# Patient Record
Sex: MALE | Race: WHITE | Employment: FULL TIME | URBAN - NONMETROPOLITAN AREA
[De-identification: names, ages, dates, MRNs, and addresses within clinical notes are randomized per-mention and may not be internally consistent; named-entity substitution may affect disease eponyms.]

---

## 2018-07-26 ENCOUNTER — NURSE ONLY (OUTPATIENT)
Dept: LAB | Age: 28
End: 2018-07-26
Payer: COMMERCIAL

## 2018-07-26 DIAGNOSIS — Z23 NEED FOR VACCINATION: Primary | ICD-10-CM

## 2018-07-26 PROCEDURE — 90715 TDAP VACCINE 7 YRS/> IM: CPT | Performed by: FAMILY MEDICINE

## 2018-07-26 PROCEDURE — 90471 IMMUNIZATION ADMIN: CPT | Performed by: FAMILY MEDICINE

## 2019-11-08 ENCOUNTER — OFFICE VISIT (OUTPATIENT)
Dept: PRIMARY CARE CLINIC | Age: 29
End: 2019-11-08
Payer: COMMERCIAL

## 2019-11-08 VITALS
BODY MASS INDEX: 37.14 KG/M2 | WEIGHT: 289.4 LBS | OXYGEN SATURATION: 98 % | SYSTOLIC BLOOD PRESSURE: 138 MMHG | DIASTOLIC BLOOD PRESSURE: 80 MMHG | TEMPERATURE: 98.5 F | RESPIRATION RATE: 18 BRPM | HEIGHT: 74 IN | HEART RATE: 123 BPM

## 2019-11-08 DIAGNOSIS — K13.79 UVULAR SWELLING: ICD-10-CM

## 2019-11-08 DIAGNOSIS — J06.9 VIRAL UPPER RESPIRATORY TRACT INFECTION: Primary | ICD-10-CM

## 2019-11-08 PROCEDURE — 99213 OFFICE O/P EST LOW 20 MIN: CPT | Performed by: NURSE PRACTITIONER

## 2019-11-08 RX ORDER — METHYLPREDNISOLONE 4 MG/1
TABLET ORAL
Qty: 1 KIT | Refills: 0 | Status: SHIPPED | OUTPATIENT
Start: 2019-11-08 | End: 2019-11-14

## 2019-11-08 ASSESSMENT — ENCOUNTER SYMPTOMS
GASTROINTESTINAL NEGATIVE: 1
SORE THROAT: 0
COUGH: 1
WHEEZING: 0
RHINORRHEA: 0
SINUS PRESSURE: 0

## 2019-11-08 ASSESSMENT — PATIENT HEALTH QUESTIONNAIRE - PHQ9
2. FEELING DOWN, DEPRESSED OR HOPELESS: 0
SUM OF ALL RESPONSES TO PHQ QUESTIONS 1-9: 0
1. LITTLE INTEREST OR PLEASURE IN DOING THINGS: 0
SUM OF ALL RESPONSES TO PHQ QUESTIONS 1-9: 0
SUM OF ALL RESPONSES TO PHQ9 QUESTIONS 1 & 2: 0

## 2020-05-08 ENCOUNTER — OFFICE VISIT (OUTPATIENT)
Dept: PRIMARY CARE CLINIC | Age: 30
End: 2020-05-08
Payer: COMMERCIAL

## 2020-05-08 VITALS
SYSTOLIC BLOOD PRESSURE: 134 MMHG | WEIGHT: 267 LBS | BODY MASS INDEX: 33.2 KG/M2 | HEIGHT: 75 IN | TEMPERATURE: 99.1 F | DIASTOLIC BLOOD PRESSURE: 82 MMHG | HEART RATE: 112 BPM | RESPIRATION RATE: 16 BRPM | OXYGEN SATURATION: 98 %

## 2020-05-08 PROCEDURE — 98925 OSTEOPATH MANJ 1-2 REGIONS: CPT | Performed by: FAMILY MEDICINE

## 2020-05-08 PROCEDURE — 99214 OFFICE O/P EST MOD 30 MIN: CPT | Performed by: FAMILY MEDICINE

## 2020-05-08 RX ORDER — PREDNISONE 20 MG/1
TABLET ORAL
Qty: 15 TABLET | Refills: 0 | Status: SHIPPED | OUTPATIENT
Start: 2020-05-08 | End: 2020-11-05 | Stop reason: ALTCHOICE

## 2020-05-08 RX ORDER — TIZANIDINE 4 MG/1
4 TABLET ORAL EVERY 8 HOURS PRN
Qty: 30 TABLET | Refills: 1 | Status: SHIPPED | OUTPATIENT
Start: 2020-05-08

## 2020-05-08 ASSESSMENT — ENCOUNTER SYMPTOMS
ABDOMINAL PAIN: 0
NAUSEA: 0
CHEST TIGHTNESS: 1
BACK PAIN: 1
COUGH: 0
SHORTNESS OF BREATH: 0
ABDOMINAL DISTENTION: 0
SPUTUM PRODUCTION: 0
CONSTIPATION: 0
DIARRHEA: 0
VOMITING: 0
EYES NEGATIVE: 1

## 2020-05-08 NOTE — PROGRESS NOTES
joint swelling, neck pain and neck stiffness. Skin: Negative. Psychiatric/Behavioral: Negative. Prior to Visit Medications    Medication Sig Taking? Authorizing Provider   predniSONE (DELTASONE) 20 MG tablet 1 bid for 5 days, 1 qd for 5 days Yes Mitul Nino DO   tiZANidine (ZANAFLEX) 4 MG tablet Take 1 tablet by mouth every 8 hours as needed (muscle spasms) Yes Mitul Nino DO        Social History     Tobacco Use    Smoking status: Former Smoker     Last attempt to quit: 2017     Years since quittin.4    Smokeless tobacco: Never Used   Substance Use Topics    Alcohol use: Yes     Comment: socially        Vitals:    20 1227   BP: 134/82   Pulse: 112   Resp: 16   Temp: 99.1 °F (37.3 °C)   SpO2: 98%   Weight: 267 lb (121.1 kg)   Height: 6' 3\" (1.905 m)     Estimated body mass index is 33.37 kg/m² as calculated from the following:    Height as of this encounter: 6' 3\" (1.905 m). Weight as of this encounter: 267 lb (121.1 kg). Physical Exam  Vitals signs and nursing note reviewed. Constitutional:       General: He is not in acute distress. Appearance: He is well-developed. He is not diaphoretic. HENT:      Head: Normocephalic and atraumatic. Eyes:      Conjunctiva/sclera: Conjunctivae normal.   Neck:      Musculoskeletal: Normal range of motion. Cardiovascular:      Rate and Rhythm: Normal rate and regular rhythm. Pulmonary:      Effort: Pulmonary effort is normal. No respiratory distress. Breath sounds: No wheezing, rhonchi or rales. Chest:      Chest wall: Tenderness present. Abdominal:      General: Abdomen is flat. Bowel sounds are normal. There is no distension. Palpations: Abdomen is soft. Tenderness: There is no abdominal tenderness. Musculoskeletal:         General: Tenderness present. Comments: Palpable anterior inferior positioning of the right rib 8 with pain with palpation to the area.   Increased paravertebral muscular

## 2020-11-05 ENCOUNTER — HOSPITAL ENCOUNTER (OUTPATIENT)
Age: 30
Setting detail: SPECIMEN
Discharge: HOME | End: 2020-11-05
Payer: COMMERCIAL

## 2020-11-05 ENCOUNTER — OFFICE VISIT (OUTPATIENT)
Dept: PRIMARY CARE CLINIC | Age: 30
End: 2020-11-05
Payer: COMMERCIAL

## 2020-11-05 VITALS
SYSTOLIC BLOOD PRESSURE: 130 MMHG | OXYGEN SATURATION: 97 % | WEIGHT: 279 LBS | TEMPERATURE: 100.2 F | HEART RATE: 120 BPM | BODY MASS INDEX: 34.87 KG/M2 | DIASTOLIC BLOOD PRESSURE: 80 MMHG

## 2020-11-05 PROCEDURE — 99213 OFFICE O/P EST LOW 20 MIN: CPT | Performed by: NURSE PRACTITIONER

## 2020-11-05 PROCEDURE — U0003 INFECTIOUS AGENT DETECTION BY NUCLEIC ACID (DNA OR RNA); SEVERE ACUTE RESPIRATORY SYNDROME CORONAVIRUS 2 (SARS-COV-2) (CORONAVIRUS DISEASE [COVID-19]), AMPLIFIED PROBE TECHNIQUE, MAKING USE OF HIGH THROUGHPUT TECHNOLOGIES AS DESCRIBED BY CMS-2020-01-R: HCPCS

## 2020-11-05 ASSESSMENT — ENCOUNTER SYMPTOMS
CHEST TIGHTNESS: 0
COUGH: 1
WHEEZING: 0
GASTROINTESTINAL NEGATIVE: 1
RHINORRHEA: 0
SINUS PRESSURE: 0
SORE THROAT: 0
SHORTNESS OF BREATH: 0

## 2020-11-05 NOTE — PROGRESS NOTES
[facility]             [address], [address]                        Telephone [phone]             Fax [phone]     Juan Carlos Wang  1990  KQZ:[id number]   Date of visit:  11/5/2020    Subjective:    Juan Carlos Wang is a 31 y.o.  male who presents to [facility] today (11/5/2020) for evaluation of:    Chief Complaint   Patient presents with    Cough     started last night. body aches. fever high of 101. slight HA. Cough   This is a new problem. The current episode started yesterday. The problem has been gradually worsening. The problem occurs every few minutes. The cough is non-productive. Associated symptoms include a fever (101.8 today), headaches (mild) and myalgias. Pertinent negatives include no chest pain, chills, ear pain, nasal congestion, postnasal drip, rash, rhinorrhea, sore throat, shortness of breath or wheezing. Nothing aggravates the symptoms. He has tried nothing for the symptoms. The treatment provided no relief. He has the following problem list:  There is no problem list on file for this patient. Current medications are:  Current Outpatient Medications   Medication Sig Dispense Refill    tiZANidine (ZANAFLEX) 4 MG tablet Take 1 tablet by mouth every 8 hours as needed (muscle spasms) (Patient not taking: Reported on 11/5/2020) 30 tablet 1     No current facility-administered medications for this visit. He has No Known Allergies. [place] He  reports that he quit smoking about 2 years ago. He has never used smokeless tobacco.      Objective:    Vitals:    11/05/20 0847   BP: 130/80   Site: Right Upper Arm   Position: Sitting   Cuff Size: Large Adult   Pulse: 120   Temp: 100.2 °F (37.9 °C)   TempSrc: Temporal   SpO2: 97%   Weight: 279 lb (126.6 kg)     Body mass index is 34.87 kg/m².     Review of Systems   Constitutional: Positive for fatigue and fever (101.8 today). Negative for appetite change and chills. HENT: Negative for congestion, ear pain, postnasal drip, rhinorrhea, sinus pressure and sore throat. Respiratory: Positive for cough. Negative for chest tightness, shortness of breath and wheezing. Cardiovascular: Negative. Negative for chest pain. Gastrointestinal: Negative. Musculoskeletal: Positive for myalgias. Skin: Negative for rash. Neurological: Positive for headaches (mild). Physical Exam  Vitals signs and nursing note reviewed. Constitutional:       Appearance: He is well-developed. HENT:      Head: Normocephalic. Jaw: There is normal jaw occlusion. Right Ear: Tympanic membrane, ear canal and external ear normal.      Left Ear: Tympanic membrane, ear canal and external ear normal.      Nose:      Right Turbinates: Swollen (erythema). Left Turbinates: Swollen (erythema). Right Sinus: No maxillary sinus tenderness or frontal sinus tenderness. Left Sinus: No maxillary sinus tenderness or frontal sinus tenderness. Mouth/Throat:      Lips: Pink. Mouth: Mucous membranes are moist.      Pharynx: Oropharynx is clear. Uvula midline. Posterior oropharyngeal erythema present. Tonsils: 1+ on the right. 1+ on the left. Eyes:      Pupils: Pupils are equal, round, and reactive to light. Neck:      Musculoskeletal: Normal range of motion and neck supple. Cardiovascular:      Rate and Rhythm: Normal rate and regular rhythm. Heart sounds: Normal heart sounds. Pulmonary:      Effort: Pulmonary effort is normal.      Breath sounds: Normal breath sounds and air entry. Comments: Dry cough noted. Lymphadenopathy:      Cervical: No cervical adenopathy. Skin:     General: Skin is warm and dry. Neurological:      Mental Status: He is alert and oriented to person, place, and time. Psychiatric:         Behavior: Behavior normal.         Thought Content:  Thought content normal. Assessment and Plan:    No results found for this visit on 11/05/20. Diagnosis Orders   1. Cough  Covid-19 Ambulatory   2. Fever, unspecified fever cause  Covid-19 Ambulatory   3. Person under investigation for COVID-19  Covid-19 Ambulatory     Self quarantine until negative Covid-19 test result received and symptoms improving. We will call with Covid-19 test results. Take Robitussin or Delsym as needed for cough. he was also encouraged to use tylenol or ibuprofen for pain/fever. Increase water intake. Use cool mist humidifier at bedtime. Use nasal saline flush as needed. Good hand hygiene. he was instructed to return if there is no improvement or symptoms worsen. The use, risks, benefits, and side effects of prescribed or recommended medications were discussed. All questions were answered and the patient/caregiver voiced understanding. No orders of the defined types were placed in this encounter.         Electronically signed by MARISOL Guido CNP on 11/5/20 at 9:04 AM EST

## 2020-11-05 NOTE — LETTER
[address] [facility]  [address] [facility] [address]  Phone: [phone]  Fax: Sandro Levine 71., APRN - [place]        November 5, 2020     Patient: Monica Bazan   YOB: 1990   Date of Visit: 11/5/2020       To Whom it May Concern:    Monica Bazan was seen in my clinic on 11/5/2020. May return to work with negative Covid-19 test result and improved symptoms. Test result in 3-7 days. If you have any questions or concerns, please don't hesitate to call.     Sincerely,         MARISOL Fleming CNP

## 2020-11-05 NOTE — PATIENT INSTRUCTIONS
Patient Education        Learning About Coronavirus [phone])  Coronavirus [phone]): Overview  What is coronavirus ([id number]-[id number])? The coronavirus disease (COVID-19) is caused by a virus. It is an illness that was first found in December 2019. It has since spread worldwide. The virus can cause fever, cough, and trouble breathing. In severe cases, it can cause pneumonia and make it hard to breathe without help. It can cause death. This virus spreads person-to-person through droplets from coughing and sneezing. It can also spread when you are close to someone who is infected. And it can spread when you touch something that has the virus on it, such as a doorknob or a tabletop. Coronaviruses are a large group of viruses. They cause the common cold. They also cause more serious illnesses like Middle East respiratory syndrome (MERS) and severe acute respiratory syndrome (SARS). COVID-19 is caused by a novel coronavirus. That means it's a new type that has not been seen in people before. How is COVID-19 treated? Mild illness can be treated at home, but more serious illness needs to be treated in the hospital. Treatment may include medicines to reduce symptoms, plus breathing support such as oxygen therapy or a ventilator. Other treatments, such as antiviral medicines, may help people who have COVID-19. What can you do to protect yourself from COVID-19? The best way to protect yourself from getting sick is to:  · Avoid areas where there is an outbreak. · Avoid contact with people who may be infected. · Avoid crowds and try to stay at least 6 feet away from other people. · Wash your hands often, especially after you cough or sneeze. Use soap and water, and scrub for at least 20 seconds. If soap and water aren't available, use an alcohol-based hand . · Avoid touching your mouth, nose, and eyes. What can you do to avoid spreading the virus to others?   To help avoid spreading the virus to others:  · [employer] your hands often with soap or alcohol-based hand sanitizers. · Cover your mouth with a tissue when you cough or sneeze. Then throw the tissue in the trash. · Use a disinfectant to clean things that you touch often. These include doorknobs, remote controls, phones, and handles on your refrigerator and microwave. And don't forget countertops, tabletops, bathrooms, and computer keyboards. · Wear a cloth face cover if you have to go to public areas. If you know or suspect that you have COVID-19:  · Stay home. Don't go to school, work, or public areas. And don't use public transportation, ride-shares, or taxis unless you have no choice. · Leave your home only if you need to get medical care or testing. But call the doctor's office first so they know you're coming. And wear a face cover. · Limit contact with people in your home. If possible, stay in a separate bedroom and use a separate bathroom. · Wear a face cover whenever you're around other people. It can help stop the spread of the virus when you cough or sneeze. · Clean and disinfect your home every day. Use household  and disinfectant wipes or sprays. Take special care to clean things that you grab with your hands. · Self-isolate until it's safe to be around others again. ? If you have symptoms, it's safe when you haven't had a fever for 3 days and your symptoms have improved and it's been at least 10 days since your symptoms started. ? If you were exposed to the virus but don't have symptoms, it's safe to be around others 14 days after exposure. ? Talk to your doctor about whether you also need testing, especially if you have a weakened immune system. When to call for help  Call 911 anytime you think you may need emergency care. For example, call if:  · You have severe trouble breathing. (You can't talk at all.)  · You have constant chest pain or pressure. · You are severely dizzy or lightheaded.   · You are confused or can't think clearly. · Your face and lips have a blue color. · You passed out (lost consciousness) or are very hard to wake up. Call your doctor now if you develop symptoms such as:  · Shortness of breath. · Fever. · Cough. If you need to get care, call ahead to the doctor's office for instructions before you go. Make sure you wear a face cover to prevent exposing other people to the virus. Where can you get the latest information? The following health organizations are tracking and studying this virus. Their websites contain the most up-to-date information. Kerry Vicky also learn what to do if you think you may have been exposed to the virus. · U.S. Centers for Disease Control and Prevention (CDC): The CDC provides updated news about the disease and travel advice. The website also tells you how to prevent the spread of infection. www.cdc.gov  · World Health Organization [facility]): WHO offers information about the virus outbreaks. WHO also has travel advice. www.who.int  Current as of: July 10, 2020               Content Version: 12.6  © 2006-2020 [employer]. Care instructions adapted under license by [facility] ([facility]). If you have questions about a medical condition or this instruction, always ask your healthcare professional. [place] [address] any warranty or liability for your use of this information. Patient Education        Coronavirus ([id number]-66): Care Instructions  Overview  The coronavirus disease (COVID-19) is caused by a virus. Symptoms may include a fever, a cough, and shortness of breath. It mainly spreads person-to-person through droplets from coughing and sneezing. The virus also can spread when people are in close contact with someone who is infected. Most people have mild symptoms and can take care of themselves at home.  If their symptoms get worse, they may need care in a hospital. Treatment may include medicines to reduce symptoms, plus breathing support such as oxygen therapy or a ventilator. It's important to not spread the virus to others. If you have COVID-19, wear a face cover anytime you are around other people. You need to isolate yourself while you are sick. Leave your home only if you need to get medical care or testing. Follow-up care is a key part of your treatment and safety. Be sure to make and go to all appointments, and call your doctor if you are having problems. It's also a good idea to know your test results and keep a list of the medicines you take. How can you care for yourself at home? · Get extra rest. It can help you feel better. · Drink plenty of fluids. This helps replace fluids lost from fever. Fluids also help ease a scratchy throat. Water, soup, fruit juice, and hot tea with lemon are good choices. · Take acetaminophen (such as Tylenol) to reduce a fever. It may also help with muscle aches. Read and follow all instructions on the label. · Use petroleum jelly on sore skin. This can help if the skin around your nose and lips becomes sore from rubbing a lot with tissues. Tips for self-isolation  · Limit contact with people in your home. If possible, stay in a separate bedroom and use a separate bathroom. · Wear a cloth face cover when you are around other people. It can help stop the spread of the virus when you cough or sneeze. · If you have to leave home, avoid crowds and try to stay at least 6 feet away from other people. · Avoid contact with pets and other animals. · Cover your mouth and nose with a tissue when you cough or sneeze. Then throw it in the trash right away. · Wash your hands often, especially after you cough or sneeze. Use soap and water, and scrub for at least 20 seconds. If soap and water aren't available, use an alcohol-based hand . · Don't share personal household items. These include bedding, towels, cups and glasses, and eating utensils.   · [employer] laundry in the warmest water allowed for the fabric type, and dry it completely. It's okay to wash other people's laundry with yours. · Clean and disinfect your home every day. Use household  and disinfectant wipes or sprays. Take special care to clean things that you grab with your hands. These include doorknobs, remote controls, phones, and handles on your refrigerator and microwave. And don't forget countertops, tabletops, bathrooms, and computer keyboards. When you can end self-isolation  · If you know or suspect that you have COVID-19, stay in self-isolation until:  ? You haven't had a fever for 3 days, and  ? Your symptoms have improved, and  ? It's been at least 10 days since your symptoms started. · Talk to your doctor about whether you also need testing, especially if you have a weakened immune system. When should you call for help? Call 911 anytime you think you may need emergency care. For example, call if you have life-threatening symptoms, such as:    · You have severe trouble breathing. (You can't talk at all.)     · You have constant chest pain or pressure.     · You are severely dizzy or lightheaded.     · You are confused or can't think clearly.     · Your face and lips have a blue color.     · You pass out (lose consciousness) or are very hard to wake up. Call your doctor now or seek immediate medical care if:    · You have moderate trouble breathing. (You can't speak a full sentence.)     · You are coughing up blood (more than about 1 teaspoon).     · You have signs of low blood pressure. These include feeling lightheaded; being too weak to stand; and having cold, pale, clammy skin. Watch closely for changes in your health, and be sure to contact your doctor if:    · Your symptoms get worse.     · You are not getting better as expected. Call before you go to the doctor's office. Follow their instructions. And wear a cloth face cover. Current as of: July 10, 2020               Content Version: 12.6  © 4230-8622 [employer]. Care instructions adapted under license by [facility] ([facility]). If you have questions about a medical condition or this instruction, always ask your healthcare professional. [place] [address] any warranty or liability for your use of this information. Patient Education        Cough: Care Instructions  Your Care Instructions     A cough is your body's response to something that bothers your throat or airways. Many things can cause a cough. You might cough because of a cold or the flu, bronchitis, or asthma. Smoking, postnasal drip, allergies, and stomach acid that backs up into your throat also can cause coughs. A cough is a symptom, not a disease. Most coughs stop when the cause, such as a cold, goes away. You can take a few steps at home to cough less and feel better. Follow-up care is a key part of your treatment and safety. Be sure to make and go to all appointments, and call your doctor if you are having problems. It's also a good idea to know your test results and keep a list of the medicines you take. How can you care for yourself at home? · Drink lots of water and other fluids. This helps thin the mucus and soothes a dry or sore throat. Honey or lemon juice in hot water or tea may ease a dry cough. · Take cough medicine as directed by your doctor. · Prop up your head on pillows to help you breathe and ease a dry cough. · Try cough drops to soothe a dry or sore throat. Cough drops don't stop a cough. Medicine-flavored cough drops are no better than candy-flavored drops or hard candy. · Do not smoke. Avoid secondhand smoke. If you need help quitting, talk to your doctor about stop-smoking programs and medicines. These can increase your chances of quitting for good. When should you call for help? Call 911 anytime you think you may need emergency care. For example, call if:    · You have severe trouble breathing.    Call your doctor now or seek immediate medical care if:    · You cough up blood.     · You have new or worse trouble breathing.     · You have a new or higher fever.     · You have a new rash. Watch closely for changes in your health, and be sure to contact your doctor if:    · You cough more deeply or more often, especially if you notice more mucus or a change in the color of your mucus.     · You have new symptoms, such as a sore throat, an earache, or sinus pain.     · You do not get better as expected. Where can you learn more? Go to https://[employer]. org and sign in to your "[employer] account. Enter D279 in the [employer] box to learn more about \"Cough: Care Instructions. \"     If you do not have an account, please click on the \"Sign Up Now\" link. Current as of: February 24, 2020               Content Version: 12.6  © 2994-5225 [employer]. Care instructions adapted under license by [facility] ([facility]). If you have questions about a medical condition or this instruction, always ask your healthcare professional. [address] any warranty or liability for your use of this information.

## 2020-11-07 LAB — SARS-COV-2, NAA: DETECTED

## 2020-11-08 ENCOUNTER — TELEPHONE (OUTPATIENT)
Dept: PRIMARY CARE CLINIC | Age: 30
End: 2020-11-08

## 2021-06-15 ENCOUNTER — HOSPITAL ENCOUNTER (OUTPATIENT)
Age: 31
Setting detail: SPECIMEN
Discharge: HOME | End: 2021-06-15
Payer: COMMERCIAL

## 2021-06-15 ENCOUNTER — OFFICE VISIT (OUTPATIENT)
Dept: PRIMARY CARE CLINIC | Age: 31
End: 2021-06-15
Payer: COMMERCIAL

## 2021-06-15 VITALS
HEIGHT: 75 IN | OXYGEN SATURATION: 98 % | HEART RATE: 113 BPM | RESPIRATION RATE: 18 BRPM | WEIGHT: 286.4 LBS | TEMPERATURE: 97.8 F | DIASTOLIC BLOOD PRESSURE: 84 MMHG | BODY MASS INDEX: 35.61 KG/M2 | SYSTOLIC BLOOD PRESSURE: 132 MMHG

## 2021-06-15 DIAGNOSIS — R09.81 CONGESTION OF NASAL SINUS: ICD-10-CM

## 2021-06-15 DIAGNOSIS — R05.9 COUGH: ICD-10-CM

## 2021-06-15 DIAGNOSIS — B97.89 ACUTE VIRAL SINUSITIS: Primary | ICD-10-CM

## 2021-06-15 DIAGNOSIS — Z20.822 PERSON UNDER INVESTIGATION FOR COVID-19: ICD-10-CM

## 2021-06-15 DIAGNOSIS — J01.90 ACUTE VIRAL SINUSITIS: Primary | ICD-10-CM

## 2021-06-15 PROCEDURE — U0005 INFEC AGEN DETEC AMPLI PROBE: HCPCS

## 2021-06-15 PROCEDURE — 99213 OFFICE O/P EST LOW 20 MIN: CPT | Performed by: NURSE PRACTITIONER

## 2021-06-15 PROCEDURE — U0003 INFECTIOUS AGENT DETECTION BY NUCLEIC ACID (DNA OR RNA); SEVERE ACUTE RESPIRATORY SYNDROME CORONAVIRUS 2 (SARS-COV-2) (CORONAVIRUS DISEASE [COVID-19]), AMPLIFIED PROBE TECHNIQUE, MAKING USE OF HIGH THROUGHPUT TECHNOLOGIES AS DESCRIBED BY CMS-2020-01-R: HCPCS

## 2021-06-15 ASSESSMENT — ENCOUNTER SYMPTOMS
WHEEZING: 0
DIARRHEA: 0
NAUSEA: 0
VOMITING: 0
SORE THROAT: 0
SHORTNESS OF BREATH: 0
RHINORRHEA: 1
COUGH: 1

## 2021-06-15 ASSESSMENT — PATIENT HEALTH QUESTIONNAIRE - PHQ9
SUM OF ALL RESPONSES TO PHQ QUESTIONS 1-9: 0
SUM OF ALL RESPONSES TO PHQ QUESTIONS 1-9: 0
2. FEELING DOWN, DEPRESSED OR HOPELESS: 0
1. LITTLE INTEREST OR PLEASURE IN DOING THINGS: 0
SUM OF ALL RESPONSES TO PHQ9 QUESTIONS 1 & 2: 0
SUM OF ALL RESPONSES TO PHQ QUESTIONS 1-9: 0

## 2021-06-15 NOTE — PATIENT INSTRUCTIONS
Will notify you of covid test result as soon as available. You should isolate at home in an area away from family. If you must be around family members, please wear a mask. Quarantine at home until result is available. This means do not go to work/school, attend family gatherings, or invite others to your home until you know your test results. A work/school note will be provided for you. Symptoms appear viral; no antibiotic warranted at this time. Increase your water intake to help thin secretions; 2-3 liters of water/day is recommended. Recommend trying sinus rinses to help thin secretions also; you may do this 3-4 times daily. May want to try daily antihistamine (zyrtec, claritin, allegra, xyzal, etc) and daily flonase nasal spray to help with chronic sinus symptoms. Tylenol and/or ibuprofen for headaches and/or fever. Warm compresses to face can help with sinus pain/pressure. Use cool mist humidifier at bedtime to help combat dry air. Practice good hand hygiene and cover your cough and sneeze to prevent passing virus on. If symptoms worsen or fail to improve, please return to clinic. If you develop severe worsening of symptoms such as chest pain or difficulty breathing, please go to ER. Patient Education        Sinusitis: Care Instructions  Your Care Instructions     Sinusitis is an infection of the lining of the sinus cavities in your head. Sinusitis often follows a cold. It causes pain and pressure in your head and face. In most cases, sinusitis gets better on its own in 1 to 2 weeks. But some mild symptoms may last for several weeks. Sometimes antibiotics are needed. Follow-up care is a key part of your treatment and safety. Be sure to make and go to all appointments, and call your doctor if you are having problems. It's also a good idea to know your test results and keep a list of the medicines you take. How can you care for yourself at home?   · Take an over-the-counter pain medicine, such as acetaminophen (Tylenol), ibuprofen (Advil, Motrin), or naproxen (Aleve). Read and follow all instructions on the label. · If the doctor prescribed antibiotics, take them as directed. Do not stop taking them just because you feel better. You need to take the full course of antibiotics. · Be careful when taking over-the-counter cold or flu medicines and Tylenol at the same time. Many of these medicines have acetaminophen, which is Tylenol. Read the labels to make sure that you are not taking more than the recommended dose. Too much acetaminophen (Tylenol) can be harmful. · Breathe warm, moist air from a steamy shower, a hot bath, or a sink filled with hot water. Avoid cold, dry air. Using a humidifier in your home may help. Follow the directions for cleaning the machine. · Use saline (saltwater) nasal washes. This can help keep your nasal passages open and wash out mucus and bacteria. You can buy saline nose drops at a grocery store or drugstore. Or you can make your own at home by adding 1 teaspoon of salt and 1 teaspoon of baking soda to 2 cups of distilled water. If you make your own, fill a bulb syringe with the solution, insert the tip into your nostril, and squeeze gently. Ericka Godwin your nose. · Put a hot, wet towel or a warm gel pack on your face 3 or 4 times a day for 5 to 10 minutes each time. · Try a decongestant nasal spray like oxymetazoline (Afrin). Do not use it for more than 3 days in a row. Using it for more than 3 days can make your congestion worse. When should you call for help? Call your doctor now or seek immediate medical care if:    · You have new or worse swelling or redness in your face or around your eyes.     · You have a new or higher fever.    Watch closely for changes in your health, and be sure to contact your doctor if:    · You have new or worse facial pain.     · The mucus from your nose becomes thicker (like pus) or has new blood in it.     · You are not getting better as syringe by relaxing your hand. · Sit down with your head tilted slightly back. Do not lie down. Put the tip of the bulb syringe or the squeeze bottle a little way into one of your nostrils. Gently drip or squirt a few drops into the nostril. Repeat with the other nostril. Some sneezing and gagging are normal at first.  · Gently blow your nose. · Wipe the syringe or bottle tip clean after each use. · Repeat this 2 or 3 times a day. · Use nasal washes gently if you have nosebleeds often. When should you call for help? Watch closely for changes in your health, and be sure to contact your doctor if:    · You often get nosebleeds.     · You have problems doing the nasal washes. Where can you learn more? Go to https://Jibe Mobilepepiceweb.R[employer] Concursos. org and sign in to your ShareMagnet account. Enter [id number] [id number] [phone] in the ThinkLink box to learn more about \"Saline Nasal Washes: Care Instructions. \"     If you do not have an account, please click on the \"Sign Up Now\" link. Current as of: December 2, 2020               Content Version: 12.8  © 1313-0185 Healthwise, Incorporated. Care instructions adapted under license by [facility] ([facility]). If you have questions about a medical condition or this instruction, always ask your healthcare professional. [address] any warranty or liability for your use of this information.

## 2021-06-15 NOTE — PROGRESS NOTES
tablet 1     No facility-administered medications prior to visit. ALLERGIES     Patient is has No Known Allergies. FAMILY HISTORY     Patient's family history is not on file. SOCIAL HISTORY     Patient  reports that he quit smoking about 3 years ago. He has never used smokeless tobacco. He reports current alcohol use. He reports that he does not use drugs. PHYSICAL EXAM     VITALS  BP: 132/84, Temp: 97.8 °F (36.6 °C), Pulse: 113, Resp: 18, SpO2: 98 %  Physical Exam  Vitals reviewed. Constitutional:       General: He is not in acute distress. Appearance: He is not ill-appearing. HENT:      Right Ear: Tympanic membrane and ear canal normal.      Left Ear: Tympanic membrane and ear canal normal.      Nose: Nose normal. No congestion or rhinorrhea. Mouth/Throat:      Mouth: Mucous membranes are moist.      Pharynx: Oropharynx is clear. No oropharyngeal exudate or posterior oropharyngeal erythema. Eyes:      Pupils: Pupils are equal, round, and reactive to light. Cardiovascular:      Rate and Rhythm: Normal rate and regular rhythm. Heart sounds: Normal heart sounds, S1 normal and S2 normal. No murmur heard. Pulmonary:      Effort: Pulmonary effort is normal. No accessory muscle usage or respiratory distress. Breath sounds: Normal breath sounds. No wheezing or rhonchi. Musculoskeletal:         General: Normal range of motion. Cervical back: Normal range of motion and neck supple. No rigidity. Lymphadenopathy:      Cervical: No cervical adenopathy. Skin:     General: Skin is warm and dry. Capillary Refill: Capillary refill takes less than 2 seconds. Neurological:      General: No focal deficit present. Mental Status: He is alert. DIAGNOSTIC RESULTS   Labs:No results found for this visit on 06/15/21.     IMAGING:        CLINICAL COURSE:     Vitals:    06/15/21 1609   BP: 132/84   Pulse: 113   Resp: 18   Temp: 97.8 °F (36.6 °C)   TempSrc: Temporal SpO2: 98%   Weight: 286 lb 6.4 oz (129.9 kg)   Height: 6' 3\" (1.905 m)           PROCEDURES:  None  FINAL IMPRESSION      1. Acute viral sinusitis    2. Cough    3. Congestion of nasal sinus    4. Person under investigation for COVID-19         DISPOSITION/PLAN   Symptoms appear viral at this time. Covid testing collected and quarantine guidelines reviewed; will notify as soon as results are available. Discussed supportive measures for symptom relief and educated pt on s/s that warrant return to clinic. Work note provided. Patient Instructions     Will notify you of covid test result as soon as available. You should isolate at home in an area away from family. If you must be around family members, please wear a mask. Quarantine at home until result is available. This means do not go to work/school, attend family gatherings, or invite others to your home until you know your test results. A work/school note will be provided for you. Symptoms appear viral; no antibiotic warranted at this time. Increase your water intake to help thin secretions; 2-3 liters of water/day is recommended. Recommend trying sinus rinses to help thin secretions also; you may do this 3-4 times daily. May want to try daily antihistamine (zyrtec, claritin, allegra, xyzal, etc) and daily flonase nasal spray to help with chronic sinus symptoms. Tylenol and/or ibuprofen for headaches and/or fever. Warm compresses to face can help with sinus pain/pressure. Use cool mist humidifier at bedtime to help combat dry air. Practice good hand hygiene and cover your cough and sneeze to prevent passing virus on. If symptoms worsen or fail to improve, please return to clinic. If you develop severe worsening of symptoms such as chest pain or difficulty breathing, please go to ER. Patient Education        Sinusitis: Care Instructions  Your Care Instructions     Sinusitis is an infection of the lining of the sinus cavities in your head. Sinusitis often follows a cold. It causes pain and pressure in your head and face. In most cases, sinusitis gets better on its own in 1 to 2 weeks. But some mild symptoms may last for several weeks. Sometimes antibiotics are needed. Follow-up care is a key part of your treatment and safety. Be sure to make and go to all appointments, and call your doctor if you are having problems. It's also a good idea to know your test results and keep a list of the medicines you take. How can you care for yourself at home? · Take an over-the-counter pain medicine, such as acetaminophen (Tylenol), ibuprofen (Advil, Motrin), or naproxen (Aleve). Read and follow all instructions on the label. · If the doctor prescribed antibiotics, take them as directed. Do not stop taking them just because you feel better. You need to take the full course of antibiotics. · Be careful when taking over-the-counter cold or flu medicines and Tylenol at the same time. Many of these medicines have acetaminophen, which is Tylenol. Read the labels to make sure that you are not taking more than the recommended dose. Too much acetaminophen (Tylenol) can be harmful. · Breathe warm, moist air from a steamy shower, a hot bath, or a sink filled with hot water. Avoid cold, dry air. Using a humidifier in your home may help. Follow the directions for cleaning the machine. · Use saline (saltwater) nasal washes. This can help keep your nasal passages open and wash out mucus and bacteria. You can buy saline nose drops at a grocery store or drugstore. Or you can make your own at home by adding 1 teaspoon of salt and 1 teaspoon of baking soda to 2 cups of distilled water. If you make your own, fill a bulb syringe with the solution, insert the tip into your nostril, and squeeze gently. Florie Pleva your nose. · Put a hot, wet towel or a warm gel pack on your face 3 or 4 times a day for 5 to 10 minutes each time.   · Try a decongestant nasal spray like oxymetazoline (Afrin). Do not use it for more than 3 days in a row. Using it for more than 3 days can make your congestion worse. When should you call for help? Call your doctor now or seek immediate medical care if:    · You have new or worse swelling or redness in your face or around your eyes.     · You have a new or higher fever. Watch closely for changes in your health, and be sure to contact your doctor if:    · You have new or worse facial pain.     · The mucus from your nose becomes thicker (like pus) or has new blood in it.     · You are not getting better as expected. Where can you learn more? Go to https://Genipepiceweb.Shippable. org and sign in to your Crocs account. Enter A804 in the [employer] box to learn more about \"Sinusitis: Care Instructions. \"     If you do not have an account, please click on the \"Sign Up Now\" link. Current as of: December 2, 2020               Content Version: 12.8  © 2006-2021 DivvyHQ. Care instructions adapted under license by [facility] ([facility]). If you have questions about a medical condition or this instruction, always ask your healthcare professional. [place] [address] any warranty or liability for your use of this information. Patient Education        Saline Nasal Washes: Care Instructions  Your Care Instructions     Saline nasal washes help keep the nasal passages open by washing out thick or dried mucus. This simple remedy can help relieve symptoms of allergies, sinusitis, and colds. It also can make the nose feel more comfortable by keeping the mucous membranes moist. You may notice a little burning sensation in your nose the first few times you use the solution, but this usually gets better in a few days. Follow-up care is a key part of your treatment and safety. Be sure to make and go to all appointments, and call your doctor if you are having problems.  It's also a good idea to know your test results and keep a list of the medicines you take. How can you care for yourself at home? · You can buy premixed saline solution in a squeeze bottle or other sinus rinse products at a drugstore. Read and follow the instructions on the label. · You also can make your own saline solution by adding 1 teaspoon of salt and 1 teaspoon of baking soda to 2 cups of distilled water. · If you use a homemade solution, pour a small amount into a clean bowl. Using a rubber bulb syringe, squeeze the syringe and place the tip in the salt water. Pull a small amount of the salt water into the syringe by relaxing your hand. · Sit down with your head tilted slightly back. Do not lie down. Put the tip of the bulb syringe or the squeeze bottle a little way into one of your nostrils. Gently drip or squirt a few drops into the nostril. Repeat with the other nostril. Some sneezing and gagging are normal at first.  · Gently blow your nose. · Wipe the syringe or bottle tip clean after each use. · Repeat this 2 or 3 times a day. · Use nasal washes gently if you have nosebleeds often. When should you call for help? Watch closely for changes in your health, and be sure to contact your doctor if:    · You often get nosebleeds.     · You have problems doing the nasal washes. Where can you learn more? Go to https://Blueshift International Materialspefelixeweb.Mobilligy. org and sign in to your QWiPS account. Enter [phone] in the [facility] box to learn more about \"Saline Nasal Washes: Care Instructions. \"     If you do not have an account, please click on the \"Sign Up Now\" link. Current as of: December 2, 2020               Content Version: 12.8  © 2006-2021 Healthwise, Cellcrypt. Care instructions adapted under license by [facility] ([facility]). If you have questions about a medical condition or this instruction, always ask your healthcare professional. Carlos Lockwood any warranty or liability for your use of this information.                The use, risks, benefits, and potential side effects of prescribed and/or recommended medications were discussed. All questions were answered and the patient/caregiver voiced understanding. Orders Placed This Encounter   Procedures    COVID-19     Standing Status:   Future     Standing Expiration Date:   7/15/2021     Scheduling Instructions:      1) Due to current limited availability of the COVID-19 test, tests will be prioritized based on responses to questions above. Testing may be delayed due to volume. 2) Print and instruct patient to adhere to CDC home isolation program. (Link Above)              3) Set up or refer patient for a monitoring program.              4) Have patient sign up for and leverage MyChart (if not previously done). Order Specific Question:   Is this test for diagnosis or screening? Answer:   Diagnosis of ill patient     Order Specific Question:   Symptomatic for COVID-19 as defined by CDC? Answer:   Yes     Order Specific Question:   Date of Symptom Onset     Answer:   6/12/2021     Order Specific Question:   Hospitalized for COVID-19? Answer:   No     Order Specific Question:   Admitted to ICU for COVID-19? Answer:   No     Order Specific Question:   Employed in healthcare setting? Answer:   No     Order Specific Question:   Resident in a congregate (group) care setting? Answer:   No     Order Specific Question:   Pregnant: Answer:   No     Order Specific Question:   Previously tested for COVID-19? Answer:   Yes     Outpatient Encounter Medications as of 6/15/2021   Medication Sig Dispense Refill    tiZANidine (ZANAFLEX) 4 MG tablet Take 1 tablet by mouth every 8 hours as needed (muscle spasms) (Patient not taking: Reported on 11/5/2020) 30 tablet 1     No facility-administered encounter medications on file as of 6/15/2021. Return if symptoms worsen or fail to improve.                 Electronically signed by Derenda Schwab, APRN - CNP on 6/15/2021 at 4:36 PM

## 2021-06-17 LAB
SARS-COV-2: ABNORMAL
SARS-COV-2: DETECTED
SOURCE: ABNORMAL

## 2021-10-15 ENCOUNTER — OFFICE VISIT (OUTPATIENT)
Dept: PRIMARY CARE CLINIC | Age: 31
End: 2021-10-15
Payer: COMMERCIAL

## 2021-10-15 VITALS
HEIGHT: 74 IN | OXYGEN SATURATION: 97 % | WEIGHT: 289.2 LBS | BODY MASS INDEX: 37.12 KG/M2 | DIASTOLIC BLOOD PRESSURE: 88 MMHG | TEMPERATURE: 97.2 F | SYSTOLIC BLOOD PRESSURE: 140 MMHG | HEART RATE: 100 BPM

## 2021-10-15 DIAGNOSIS — S39.012A STRAIN OF LUMBAR REGION, INITIAL ENCOUNTER: Primary | ICD-10-CM

## 2021-10-15 PROCEDURE — 99213 OFFICE O/P EST LOW 20 MIN: CPT | Performed by: FAMILY MEDICINE

## 2021-10-15 RX ORDER — PREDNISONE 20 MG/1
TABLET ORAL
Qty: 11 TABLET | Refills: 0 | Status: SHIPPED | OUTPATIENT
Start: 2021-10-15

## 2021-10-15 ASSESSMENT — ENCOUNTER SYMPTOMS: BACK PAIN: 1

## 2021-10-15 NOTE — PROGRESS NOTES
[address]  [address], [address] [place]  [phone]      Lynda Vaca is a 27 y.o. male who is c/o of Back Pain (lowers back, x 2 nights ago, NKI)      HPI:     Back Pain  This is a new problem. The current episode started in the past 7 days (2 nights ago, while at work - started to feel \"stiff\"). The pain is present in the lumbar spine (Just above his waistline on L side). The quality of the pain is described as aching and cramping. Radiates to: Had been radiating to L hip and groin; now not going to groin anymore. The pain is at a severity of 7/10. The symptoms are aggravated by bending and sitting. Pertinent negatives include no numbness or weakness. Took a Zanaflex 4 mg yesterday morning that he still had at home from old Rx - didn't help at all. Has tried heat, BioFreeze, Tylenol Back & Body, and  as well. Feels best to lie down flat. Called off work last night due to pain. Subjective:      History reviewed. No pertinent past medical history. History reviewed. No pertinent surgical history. Social History     Tobacco Use    Smoking status: Former Smoker     Quit date: 11/8/2017     Years since quitting: 3.9    Smokeless tobacco: Never Used   Substance Use Topics    Alcohol use: Yes     Comment: socially      Current Outpatient Medications   Medication Sig Dispense Refill    predniSONE (DELTASONE) 20 MG tablet Take 2 tabs by mouth daily x 4 days, then 1 tab daily x 3 days. 11 tablet 0    tiZANidine (ZANAFLEX) 4 MG tablet Take 1 tablet by mouth every 8 hours as needed (muscle spasms) 30 tablet 1     No current facility-administered medications for this visit. No Known Allergies    Review of Systems   Musculoskeletal: Positive for back pain. Neurological: Negative for weakness and numbness.        Objective:     Vitals:    10/15/21 0934   BP: (!) 140/88   Site: Right Upper Arm   Position: Sitting   Cuff Size: Large Adult   Pulse: 100   Temp: 97.2 °F (36.2 °C)   TempSrc: Tympanic   SpO2: 97%   Weight: 289 lb 3.2 oz (131.2 kg)   Height: 6' 2\" (1.88 m)     Physical Exam  Constitutional:       General: He is not in acute distress. Appearance: Normal appearance. HENT:      Head: Normocephalic and atraumatic. Eyes:      Conjunctiva/sclera: Conjunctivae normal.   Cardiovascular:      Rate and Rhythm: Normal rate and regular rhythm. Heart sounds: Normal heart sounds. Pulmonary:      Effort: Pulmonary effort is normal. No respiratory distress. Breath sounds: Normal breath sounds. Abdominal:      General: Bowel sounds are normal. There is no distension. Palpations: Abdomen is soft. Tenderness: There is no abdominal tenderness. Musculoskeletal:      Lumbar back: Tenderness present. Right lower leg: No edema. Left lower leg: No edema. Skin:     General: Skin is warm and dry. Neurological:      General: No focal deficit present. Mental Status: He is alert and oriented to person, place, and time. Psychiatric:         Mood and Affect: Mood normal.         Assessment:       Diagnosis Orders   1. Strain of lumbar region, initial encounter  predniSONE (DELTASONE) 20 MG tablet       Plan:      Return if symptoms worsen or fail to improve in 5-7 days. No orders of the defined types were placed in this encounter. Orders Placed This Encounter   Medications    predniSONE (DELTASONE) 20 MG tablet     Sig: Take 2 tabs by mouth daily x 4 days, then 1 tab daily x 3 days. Dispense:  11 tablet     Refill:  0       Patient given educational materials - see patient instructions. Discussed use, benefit, and side effects of prescribed medications. All patient questions answered. Pt voiced understanding.        Electronically signed by Kerline Carlson DO, DO on 10/25/2021 at 12:21 AM

## 2021-10-15 NOTE — PATIENT INSTRUCTIONS
Patient Education        Back Strain: Care Instructions  Overview     A back strain happens when you overstretch, or pull, a muscle in your back. You may hurt your back in an accident or when you exercise or lift something. Sometimes you may not know how you hurt your back. Most back pain will get better with rest and time. You can take care of yourself at home to help your back heal.  Follow-up care is a key part of your treatment and safety. Be sure to make and go to all appointments, and call your doctor if you are having problems. It's also a good idea to know your test results and keep a list of the medicines you take. How can you care for yourself at home? · Try to stay as active as you can, but stop or reduce any activity that causes pain. · Put ice or a cold pack on the sore muscle for 10 to 20 minutes at a time to stop swelling. Try this every 1 to 2 hours for 3 days (when you are awake) or until the swelling goes down. Put a thin cloth between the ice pack and your skin. · After 2 or 3 days, apply a heating pad on low or a warm cloth to your back. Some doctors suggest that you go back and forth between hot and cold treatments. · Take pain medicines exactly as directed. ? If the doctor gave you a prescription medicine for pain, take it as prescribed. ? If you are not taking a prescription pain medicine, ask your doctor if you can take an over-the-counter medicine. · Try sleeping on your side with a pillow between your legs. Or put a pillow under your knees when you lie on your back. These measures can ease pain in your lower back. · Return to your usual level of activity slowly. When should you call for help? Call 911 anytime you think you may need emergency care. For example, call if:    · You are unable to move a leg at all. Call your doctor now or seek immediate medical care if:    · You have new or worse symptoms in your legs, belly, or buttocks.  Symptoms may include:  ? Numbness or tingling. ? Weakness. ? Pain.     · You lose bladder or bowel control. Watch closely for changes in your health, and be sure to contact your doctor if:    · You have a fever, lose weight, or don't feel well.     · You are not getting better as expected. Where can you learn more? Go to https://chpepiceweb.MyTrade. org and sign in to your [employer] account. Enter V553 in the Wealink.com box to learn more about \"Back Strain: Care Instructions. \"     If you do not have an account, please click on the \"Sign Up Now\" link. Current as of: July 1, 2021               Content Version: 13.0  © 2006-2021 Healthwise, Incorporated. Care instructions adapted under license by [facility] ([facility]). If you have questions about a medical condition or this instruction, always ask your healthcare professional. [address] any warranty or liability for your use of this information.

## 2022-01-06 ENCOUNTER — TELEPHONE (OUTPATIENT)
Dept: PRIMARY CARE CLINIC | Age: 32
End: 2022-01-06

## 2022-01-06 ENCOUNTER — HOSPITAL ENCOUNTER (OUTPATIENT)
Age: 32
Setting detail: SPECIMEN
Discharge: HOME | End: 2022-01-06
Payer: COMMERCIAL

## 2022-01-06 DIAGNOSIS — R05.9 COUGH: Primary | ICD-10-CM

## 2022-01-06 DIAGNOSIS — R05.9 COUGH: ICD-10-CM

## 2022-01-06 PROCEDURE — U0005 INFEC AGEN DETEC AMPLI PROBE: HCPCS

## 2022-01-06 PROCEDURE — U0003 INFECTIOUS AGENT DETECTION BY NUCLEIC ACID (DNA OR RNA); SEVERE ACUTE RESPIRATORY SYNDROME CORONAVIRUS 2 (SARS-COV-2) (CORONAVIRUS DISEASE [COVID-19]), AMPLIFIED PROBE TECHNIQUE, MAKING USE OF HIGH THROUGHPUT TECHNOLOGIES AS DESCRIBED BY CMS-2020-01-R: HCPCS

## 2022-01-06 NOTE — LETTER
[address] [facility] A department of [place] [address]  Phone: [phone]  Fax: [phone]    Robert Mckeon MD        January 6, 2022     Patient: Debora Kelsey   YOB: 1990   Date of Visit: 1/6/2022       To Whom It May Concern: It is my medical opinion that Debora Kelsey may return to work on 1/8/22. He missed work starting 1/6 due to concern for covid. If you have any questions or concerns, please don't hesitate to call.     Sincerely,        Robert Mckeon MD

## 2022-01-06 NOTE — TELEPHONE ENCOUNTER
Sore throat cough home test positive Donya Luther request PCR test and note.  Can he come in for a Covid test?

## 2022-01-08 LAB
SARS-COV-2: ABNORMAL
SARS-COV-2: DETECTED
SOURCE: ABNORMAL

## 2023-02-06 ENCOUNTER — OFFICE VISIT (OUTPATIENT)
Dept: PRIMARY CARE CLINIC | Age: 33
End: 2023-02-06
Payer: COMMERCIAL

## 2023-02-06 VITALS
HEART RATE: 102 BPM | HEIGHT: 75 IN | BODY MASS INDEX: 37.05 KG/M2 | WEIGHT: 298 LBS | OXYGEN SATURATION: 98 % | TEMPERATURE: 98 F | RESPIRATION RATE: 20 BRPM | DIASTOLIC BLOOD PRESSURE: 84 MMHG | SYSTOLIC BLOOD PRESSURE: 132 MMHG

## 2023-02-06 DIAGNOSIS — L50.9 URTICARIA: Primary | ICD-10-CM

## 2023-02-06 PROCEDURE — 99213 OFFICE O/P EST LOW 20 MIN: CPT | Performed by: FAMILY MEDICINE

## 2023-02-06 RX ORDER — PREDNISONE 20 MG/1
TABLET ORAL
Qty: 15 TABLET | Refills: 0 | Status: SHIPPED | OUTPATIENT
Start: 2023-02-06

## 2023-02-06 ASSESSMENT — ENCOUNTER SYMPTOMS
SORE THROAT: 0
RHINORRHEA: 0
FACIAL SWELLING: 0
SINUS PRESSURE: 0
SHORTNESS OF BREATH: 0
SINUS PAIN: 0
COLOR CHANGE: 1

## 2023-02-06 NOTE — PROGRESS NOTES
2023     Mona Canas (:  1990) is a 34 y.o. male, here for evaluation of the following medical concerns:    Rash  This is a new problem. The current episode started yesterday. The problem has been waxing and waning since onset. The rash is diffuse (worse in hot spots (bilateral axilla and groin and upper inner thighs)). The rash is characterized by itchiness and redness. Associated with: ate a new packaged food ([employer]ills[employer] corn dog wraps) and broke out within 30 minutes. Pertinent negatives include no congestion, facial edema, fatigue, fever, joint pain, rhinorrhea, shortness of breath or sore throat. Past treatments include nothing. Did review patient's med list, allergies, social history,pmhx and pshx today as noted in the record. Review of Systems   Constitutional:  Negative for chills, fatigue and fever. HENT:  Negative for congestion, ear pain, facial swelling, postnasal drip, rhinorrhea, sinus pressure, sinus pain and sore throat. Respiratory:  Negative for shortness of breath. Musculoskeletal:  Negative for joint pain. Skin:  Positive for color change and rash. Negative for pallor and wound. Prior to Visit Medications    Medication Sig Taking?  Authorizing Provider   predniSONE (DELTASONE) 20 MG tablet 1 bid for 5 days, 1 qd for 5 days Yes Shalini Escobar DO   tiZANidine (ZANAFLEX) 4 MG tablet Take 1 tablet by mouth every 8 hours as needed (muscle spasms)  Patient not taking: Reported on 2023  Shalini Escobar DO        Social History     Tobacco Use    Smoking status: Former     Types: Cigarettes     Quit date: 2017     Years since quittin.2    Smokeless tobacco: Never   Substance Use Topics    Alcohol use: Yes     Comment: socially        Vitals:    23 0936   BP: 132/84   Site: Left Upper Arm   Position: Sitting   Cuff Size: Large Adult   Pulse: (!) 102   Resp: 20   Temp: 98 °F (36.7 °C)   TempSrc: Tympanic   SpO2: 98%   Weight: 298 lb (135.2 kg)   Height: 6' 3\" (1.905 m)     Estimated body mass index is 37.25 kg/m² as calculated from the following:    Height as of this encounter: 6' 3\" (1.905 m). Weight as of this encounter: 298 lb (135.2 kg). Physical Exam  Vitals and nursing note reviewed. Constitutional:       General: He is not in acute distress. Appearance: He is well-developed. He is not diaphoretic. HENT:      Head: Normocephalic and atraumatic. Eyes:      Conjunctiva/sclera: Conjunctivae normal.   Pulmonary:      Effort: Pulmonary effort is normal.   Musculoskeletal:      Cervical back: Normal range of motion. Skin:     General: Skin is warm and dry. Coloration: Skin is not pale. Findings: Erythema and rash present. Comments: Erythematous large patches noted to the bilateral axillary region and inner upper thighs and groin that are slightly raised and welted. Neurological:      Mental Status: He is alert and oriented to person, place, and time. Psychiatric:         Behavior: Behavior normal.         Thought Content: Thought content normal.         Judgment: Judgment normal.       ASSESSMENT/PLAN:    Encounter Diagnosis   Name Primary? Urticaria Yes     Orders Placed This Encounter   Medications    predniSONE (DELTASONE) 20 MG tablet     Si bid for 5 days, 1 qd for 5 days     Dispense:  15 tablet     Refill:  0     RX as noted above. Would use over the counter antihistamines as needed for itching. Return  if no improvement in symptoms or if any further symptoms arise. No follow-ups on file. An electronic signature was used to authenticate this note.     --Reyna Sylvester DO on 2023 at 10:19 AM

## 2023-02-06 NOTE — LETTER
[address] [facility] A department of [facility]  Phone: [phone]  Fax: [phone]        Lesa Gooden, PROVIDER [facility]      February 6, 2023    Patient:   Malik Lab  Date of Birth   1990  Date of visit   2/6/2023        To Whom it May Concern:      Malik Lab was seen in my clinic on 2/6/2023. Please excuse from work 2/6/23 due to acute medical illness. If you have any questions or concerns, please don't hesitate to call.       Sincerely,      SCHEDULE, PROVIDER [address] [place] [address]